# Patient Record
Sex: FEMALE | Race: ASIAN | NOT HISPANIC OR LATINO
[De-identification: names, ages, dates, MRNs, and addresses within clinical notes are randomized per-mention and may not be internally consistent; named-entity substitution may affect disease eponyms.]

---

## 2022-08-02 ENCOUNTER — APPOINTMENT (OUTPATIENT)
Dept: OTOLARYNGOLOGY | Facility: CLINIC | Age: 52
End: 2022-08-02

## 2022-08-02 ENCOUNTER — TRANSCRIPTION ENCOUNTER (OUTPATIENT)
Age: 52
End: 2022-08-02

## 2022-08-02 VITALS
RESPIRATION RATE: 18 BRPM | DIASTOLIC BLOOD PRESSURE: 102 MMHG | TEMPERATURE: 98 F | HEART RATE: 115 BPM | SYSTOLIC BLOOD PRESSURE: 154 MMHG | OXYGEN SATURATION: 97 %

## 2022-08-02 DIAGNOSIS — Z78.9 OTHER SPECIFIED HEALTH STATUS: ICD-10-CM

## 2022-08-02 DIAGNOSIS — Z86.39 PERSONAL HISTORY OF OTHER ENDOCRINE, NUTRITIONAL AND METABOLIC DISEASE: ICD-10-CM

## 2022-08-02 DIAGNOSIS — Z72.89 OTHER PROBLEMS RELATED TO LIFESTYLE: ICD-10-CM

## 2022-08-02 DIAGNOSIS — M54.9 CERVICALGIA: ICD-10-CM

## 2022-08-02 DIAGNOSIS — Z82.49 FAMILY HISTORY OF ISCHEMIC HEART DISEASE AND OTHER DISEASES OF THE CIRCULATORY SYSTEM: ICD-10-CM

## 2022-08-02 DIAGNOSIS — Z87.898 PERSONAL HISTORY OF OTHER SPECIFIED CONDITIONS: ICD-10-CM

## 2022-08-02 DIAGNOSIS — M54.2 CERVICALGIA: ICD-10-CM

## 2022-08-02 DIAGNOSIS — Z83.49 FAMILY HISTORY OF OTHER ENDOCRINE, NUTRITIONAL AND METABOLIC DISEASES: ICD-10-CM

## 2022-08-02 DIAGNOSIS — Z83.3 FAMILY HISTORY OF DIABETES MELLITUS: ICD-10-CM

## 2022-08-02 DIAGNOSIS — Z80.9 FAMILY HISTORY OF MALIGNANT NEOPLASM, UNSPECIFIED: ICD-10-CM

## 2022-08-02 DIAGNOSIS — G89.29 CERVICALGIA: ICD-10-CM

## 2022-08-02 DIAGNOSIS — F17.200 NICOTINE DEPENDENCE, UNSPECIFIED, UNCOMPLICATED: ICD-10-CM

## 2022-08-02 DIAGNOSIS — Z87.09 PERSONAL HISTORY OF OTHER DISEASES OF THE RESPIRATORY SYSTEM: ICD-10-CM

## 2022-08-02 PROBLEM — Z00.00 ENCOUNTER FOR PREVENTIVE HEALTH EXAMINATION: Status: ACTIVE | Noted: 2022-08-02

## 2022-08-02 PROCEDURE — 99203 OFFICE O/P NEW LOW 30 MIN: CPT

## 2022-08-02 RX ORDER — PREDNISOLONE ACETATE 10 MG/ML
1 SUSPENSION/ DROPS OPHTHALMIC
Qty: 5 | Refills: 0 | Status: ACTIVE | COMMUNITY
Start: 2022-07-27

## 2022-08-02 RX ORDER — OFLOXACIN OTIC 3 MG/ML
0.3 SOLUTION AURICULAR (OTIC)
Qty: 5 | Refills: 0 | Status: ACTIVE | COMMUNITY
Start: 2022-06-23

## 2022-08-02 RX ORDER — MOMETASONE FUROATE 1 MG/ML
0.1 SOLUTION TOPICAL
Qty: 30 | Refills: 0 | Status: ACTIVE | COMMUNITY
Start: 2022-07-24

## 2022-08-02 RX ORDER — IBUPROFEN 400 MG/1
400 TABLET, FILM COATED ORAL
Refills: 0 | Status: ACTIVE | COMMUNITY

## 2022-08-02 NOTE — HISTORY OF PRESENT ILLNESS
[de-identified] : 53 yo F who >3 yrs ago bl put in new ventilation fans and heard thumping in ear. She has a problem with lo frequency sounds like refrigerator compressor, water pump, if severe it "stays with her" and she notices it for a while - if fan is off feels better. Since 4/2019 had heart palpitations, peripheral neuropathy and weakness - she saw neurologist, physiatrist, cardiologist, rheumatologist and had workup and nothing found. Neighbor also notes it this problem. 4 residents in [facility] have it and feel ill. 2 Sundays ago could not sleep; worse if lies down; her body feels like it is vibrating last week took overdose of benadryl for sleep; took 3 tabs 25 mg over 5 hrs lost motor control and speech function. 5 weeks ago had outer ear infx and friend who is ent prescribed drops x 5d floxin  ear still felt full and itchy. s/w another doctor and told look irritated and given prednisone drops they did not help sun am all other noise seems extremely loud and could not tolerate [employer] and playing golf too loud bird noise and plane noise. She repeatedly mentions that she cannot sleep. No FH or SH relevant to cc.

## 2022-08-05 ENCOUNTER — APPOINTMENT (OUTPATIENT)
Dept: OTOLARYNGOLOGY | Facility: CLINIC | Age: 52
End: 2022-08-05

## 2022-08-05 VITALS
OXYGEN SATURATION: 100 % | RESPIRATION RATE: 16 BRPM | DIASTOLIC BLOOD PRESSURE: 114 MMHG | HEIGHT: 63 IN | TEMPERATURE: 98 F | WEIGHT: 117 LBS | SYSTOLIC BLOOD PRESSURE: 169 MMHG | HEART RATE: 91 BPM | BODY MASS INDEX: 20.73 KG/M2

## 2022-08-05 VITALS
RESPIRATION RATE: 16 BRPM | DIASTOLIC BLOOD PRESSURE: 108 MMHG | SYSTOLIC BLOOD PRESSURE: 159 MMHG | OXYGEN SATURATION: 100 % | HEART RATE: 91 BPM

## 2022-08-05 DIAGNOSIS — H69.80 OTHER SPECIFIED DISORDERS OF EUSTACHIAN TUBE, UNSPECIFIED EAR: ICD-10-CM

## 2022-08-05 PROCEDURE — 92557 COMPREHENSIVE HEARING TEST: CPT

## 2022-08-05 PROCEDURE — 92550 TYMPANOMETRY & REFLEX THRESH: CPT

## 2022-08-05 PROCEDURE — 31231 NASAL ENDOSCOPY DX: CPT

## 2022-08-05 PROCEDURE — 92517 VEMP TEST I&R CERVICAL: CPT

## 2022-08-05 PROCEDURE — 99213 OFFICE O/P EST LOW 20 MIN: CPT | Mod: 25

## 2022-08-05 NOTE — HISTORY OF PRESENT ILLNESS
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 She has obtained  and vemp. She reports that she is now noticing periodic pulsatile tinnitus, magdalena if she is near the fans but is not sure whether it happens elsewhere.. She has contacted Rashawn Harry for assistance in dealing with it.

## 2022-08-05 NOTE — DATA REVIEWED
[de-identified] :  r asymm hf snhl 15 dB r mildly negative mep but b type a tymps reviewed with pt; vemp normal

## 2022-08-05 NOTE — PROCEDURE
[Posterior Lesion] : posterior lesion [Anterior rhinoscopy insufficient to account for symptoms] : anterior rhinoscopy insufficient to account for symptoms [Flexible Endoscope] : examined with the flexible endoscope [Normal] : the paranasal sinuses had no abnormalities [FreeTextEntry6] : npx clear - done to ro npx lesion and et blockage due to pulsatile tinnitus history

## 2022-08-05 NOTE — REASON FOR VISIT
[Subsequent Evaluation] : a subsequent evaluation for [FreeTextEntry2] : hyperacusis, pulsatile tinnitus

## 2022-08-05 NOTE — ASSESSMENT
[FreeTextEntry1] : hyperacusis, magdalena to fans in her bldg\par pulsatile tinnitus also mainly with fans in her bldg but she is unsure of whether it happens elsewhere\par mri mra mrv\par rtc with above\par agree with her having sessions with Dr Harry in the meantime

## 2022-08-11 ENCOUNTER — APPOINTMENT (OUTPATIENT)
Dept: OTOLARYNGOLOGY | Facility: CLINIC | Age: 52
End: 2022-08-11

## 2022-08-11 VITALS
HEIGHT: 63 IN | BODY MASS INDEX: 20.73 KG/M2 | TEMPERATURE: 98.2 F | DIASTOLIC BLOOD PRESSURE: 100 MMHG | WEIGHT: 117 LBS | SYSTOLIC BLOOD PRESSURE: 156 MMHG | HEART RATE: 77 BPM | OXYGEN SATURATION: 97 % | RESPIRATION RATE: 16 BRPM

## 2022-08-11 DIAGNOSIS — H93.239 HYPERACUSIS, UNSPECIFIED EAR: ICD-10-CM

## 2022-08-11 DIAGNOSIS — H93.A3 PULSATILE TINNITUS, BILATERAL: ICD-10-CM

## 2022-08-11 PROCEDURE — 99213 OFFICE O/P EST LOW 20 MIN: CPT

## 2022-08-11 NOTE — ASSESSMENT
[FreeTextEntry1] : hyperacusis, pulsatile tinnitus\par -MRI showed chronic white matter changes- images and report reviewed with pt\par -pt has h/o migraines in the past which could explain this - recommended she followup with a neurologist, she states she has one\par -MRA / MRV brain nl- results reviewed with pt\par -MRA neck nl- results reviewed with pt \par -recommended followup with her internist for possible HTN (156/100 today) - copuld cause pulsatile tinnitus\par -continue trt sessions with Rashawn Harry\par RTC as needed\par \par PHIL Calzada was scribe for this note; it was reviewed and modified as necessary by Cesar Munoz MD\par

## 2022-08-11 NOTE — HISTORY OF PRESENT ILLNESS
[de-identified] : 6 day followup visit for this 53 y/o F with hyperacusis, especially to fans in her building. She is also c/o pulsatile tinnitus. She is here to review MRI, MRA/ MRV. She has contacted Rashawn Harry for tinnitus retraining therapy.

## 2022-08-11 NOTE — DATA REVIEWED
[de-identified] : MRI showed chronic white matter changes- images and report reviewed with pt\par MRA / MRV brain nl- results reviewed with pt\par MRA neck nl- results reviewed with pt